# Patient Record
Sex: FEMALE | Race: BLACK OR AFRICAN AMERICAN | Employment: UNEMPLOYED | URBAN - NONMETROPOLITAN AREA
[De-identification: names, ages, dates, MRNs, and addresses within clinical notes are randomized per-mention and may not be internally consistent; named-entity substitution may affect disease eponyms.]

---

## 2017-09-14 ENCOUNTER — OFFICE VISIT (OUTPATIENT)
Dept: FAMILY MEDICINE CLINIC | Age: 32
End: 2017-09-14
Payer: OTHER GOVERNMENT

## 2017-09-14 VITALS
BODY MASS INDEX: 28.17 KG/M2 | OXYGEN SATURATION: 98 % | SYSTOLIC BLOOD PRESSURE: 110 MMHG | WEIGHT: 165 LBS | DIASTOLIC BLOOD PRESSURE: 72 MMHG | HEIGHT: 64 IN | RESPIRATION RATE: 16 BRPM | HEART RATE: 86 BPM

## 2017-09-14 DIAGNOSIS — Z00.00 ENCOUNTER FOR ANNUAL PHYSICAL EXAM: Primary | ICD-10-CM

## 2017-09-14 PROCEDURE — 99385 PREV VISIT NEW AGE 18-39: CPT | Performed by: FAMILY MEDICINE

## 2017-09-14 ASSESSMENT — ENCOUNTER SYMPTOMS
EYE DISCHARGE: 0
DIARRHEA: 0
ABDOMINAL PAIN: 0
WHEEZING: 0
NAUSEA: 0
SORE THROAT: 0
CONSTIPATION: 0
SHORTNESS OF BREATH: 0
COUGH: 0
RHINORRHEA: 0

## 2017-09-14 ASSESSMENT — PATIENT HEALTH QUESTIONNAIRE - PHQ9
2. FEELING DOWN, DEPRESSED OR HOPELESS: 0
1. LITTLE INTEREST OR PLEASURE IN DOING THINGS: 0
SUM OF ALL RESPONSES TO PHQ QUESTIONS 1-9: 0
SUM OF ALL RESPONSES TO PHQ9 QUESTIONS 1 & 2: 0

## 2017-11-10 ENCOUNTER — HOSPITAL ENCOUNTER (EMERGENCY)
Age: 32
Discharge: HOME | End: 2017-11-10
Payer: OTHER GOVERNMENT

## 2017-11-10 VITALS
TEMPERATURE: 99.8 F | HEART RATE: 109 BPM | OXYGEN SATURATION: 96 % | HEIGHT: 64 IN | DIASTOLIC BLOOD PRESSURE: 82 MMHG | WEIGHT: 165 LBS | BODY MASS INDEX: 28.17 KG/M2 | RESPIRATION RATE: 16 BRPM | SYSTOLIC BLOOD PRESSURE: 133 MMHG

## 2017-11-10 DIAGNOSIS — J03.00 ACUTE NON-RECURRENT STREPTOCOCCAL TONSILLITIS: Primary | ICD-10-CM

## 2017-11-10 LAB
GROUP A STREP CULTURE, REFLEX: POSITIVE
REFLEX THROAT C + S: ABNORMAL

## 2017-11-10 PROCEDURE — 87880 STREP A ASSAY W/OPTIC: CPT

## 2017-11-10 PROCEDURE — 99213 OFFICE O/P EST LOW 20 MIN: CPT

## 2017-11-10 PROCEDURE — 99213 OFFICE O/P EST LOW 20 MIN: CPT | Performed by: NURSE PRACTITIONER

## 2017-11-10 RX ORDER — AMOXICILLIN 875 MG/1
875 TABLET, COATED ORAL 2 TIMES DAILY
Qty: 20 TABLET | Refills: 0 | Status: SHIPPED | OUTPATIENT
Start: 2017-11-10 | End: 2017-11-20

## 2017-11-10 ASSESSMENT — ENCOUNTER SYMPTOMS
SHORTNESS OF BREATH: 0
TROUBLE SWALLOWING: 0
SORE THROAT: 1
VOMITING: 0
RHINORRHEA: 0
COUGH: 0

## 2017-11-10 ASSESSMENT — PAIN SCALES - GENERAL: PAINLEVEL_OUTOF10: 10

## 2017-11-10 ASSESSMENT — PAIN DESCRIPTION - LOCATION: LOCATION: THROAT

## 2017-11-10 NOTE — ED PROVIDER NOTES
data to display  PROCEDURES:  None  FINAL IMPRESSION      1. Acute non-recurrent streptococcal tonsillitis        DISPOSITION/PLAN   DISPOSITION Decision to Discharge     Nontoxic, no acute distress. Amoxicillin as prescribed. Diet as tolerated. If symptoms worsen go to ER. PATIENT REFERRED TO:  Levi Nguyen MD  [address] [place] [facility] [address]  [phone]    In 3 days  Follow up as needed. AMOXIL as prescribed. Diet as tolerated, increase fluids. If worse go to ER.     DISCHARGE MEDICATIONS:  Discharge Medication List as of 11/10/2017 12:35 PM      START taking these medications    Details   amoxicillin (AMOXIL) 875 MG tablet Take 1 tablet by mouth 2 times daily for 10 days, Disp-20 tablet, R-0Normal           Discharge Medication List as of 11/10/2017 12:35 PM          SALBADOR De La O NP  11/10/17 6684

## 2017-12-11 ENCOUNTER — OFFICE VISIT (OUTPATIENT)
Dept: FAMILY MEDICINE CLINIC | Age: 32
End: 2017-12-11
Payer: OTHER GOVERNMENT

## 2017-12-11 VITALS
OXYGEN SATURATION: 97 % | WEIGHT: 168.8 LBS | BODY MASS INDEX: 28.97 KG/M2 | SYSTOLIC BLOOD PRESSURE: 122 MMHG | RESPIRATION RATE: 16 BRPM | HEART RATE: 78 BPM | DIASTOLIC BLOOD PRESSURE: 58 MMHG

## 2017-12-11 DIAGNOSIS — Z01.419 ENCOUNTER FOR GYNECOLOGICAL EXAMINATION WITHOUT ABNORMAL FINDING: Primary | ICD-10-CM

## 2017-12-11 DIAGNOSIS — Z11.3 SCREEN FOR STD (SEXUALLY TRANSMITTED DISEASE): ICD-10-CM

## 2017-12-11 DIAGNOSIS — N94.6 DYSMENORRHEA: ICD-10-CM

## 2017-12-11 PROCEDURE — 99395 PREV VISIT EST AGE 18-39: CPT | Performed by: FAMILY MEDICINE

## 2017-12-11 ASSESSMENT — ENCOUNTER SYMPTOMS
COUGH: 0
SORE THROAT: 0
DIARRHEA: 0
WHEEZING: 0
CONSTIPATION: 0
RHINORRHEA: 0
ABDOMINAL PAIN: 0
SHORTNESS OF BREATH: 0
NAUSEA: 0

## 2017-12-11 NOTE — PROGRESS NOTES
Dallin Solorzano  [address] [place] [address]  Dept: [phone]  Dept Fax: [phone]  Loc: [phone]    Sammy Marino is a 29 y.o. female who presents today for her medical conditions/complaints as noted below. Chief Complaint   Patient presents with    Gynecologic Exam     annual pap exam. having alot of clots and and pain during anf after period. also alot of pain after period           HPI:     Here for Pap smear. States she has had excessive bleeding during her periods as well as cramping and clots. She is trying to conceive but has been unable to do so. She does have a history of STDs with trichomonas being one year ago. Would like screen for STDs again today. States that she occasionally has pain in her left side but was told that she has a cyst on her left ovary. 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 Otherwise patient is doing well without complaints. No current outpatient prescriptions on file. No current facility-administered medications for this visit. No Known Allergies    Subjective:      Review of Systems   Constitutional: Negative for chills, fatigue and fever. HENT: Negative for congestion, rhinorrhea and sore throat. Respiratory: Negative for cough, shortness of breath and wheezing. Cardiovascular: Negative for chest pain and palpitations. Gastrointestinal: Negative for abdominal pain, constipation, diarrhea and nausea. Genitourinary: Positive for menstrual problem, pelvic pain and vaginal bleeding. Negative for dysuria and hematuria. Musculoskeletal: Negative for arthralgias and myalgias. Neurological: Negative for dizziness and headaches. Psychiatric/Behavioral: Negative for sleep disturbance. The patient is not nervous/anxious.         Objective:     BP (!) 122/58 (Site: Left Arm, Position: Sitting)   Pulse 78   Resp 16   Wt 168 lb 12.8 oz (76.6 kg)   SpO2 97%   BMI 28.97 kg/m²     Physical Exam   Constitutional: She is oriented to person, place, and time. She appears well-developed and well-nourished. HENT:   Head: Normocephalic and atraumatic. Eyes: Conjunctivae and EOM are normal. Right eye exhibits no discharge. Left eye exhibits no discharge. No scleral icterus. Neck: Normal range of motion. Cardiovascular: Normal rate, regular rhythm and normal heart sounds. Pulmonary/Chest: Effort normal and breath sounds normal. She has no wheezes. Abdominal: Soft. Bowel sounds are normal. She exhibits no distension. There is no tenderness. Genitourinary: Uterus normal. There is no rash, tenderness, lesion or injury on the right labia. There is no rash, tenderness, lesion or injury on the left labia. Cervix exhibits no motion tenderness, no discharge and no friability. Right adnexum displays no mass, no tenderness and no fullness. Left adnexum displays tenderness. Left adnexum displays no mass and no fullness. There is tenderness in the vagina. Vaginal discharge found. Musculoskeletal: She exhibits no edema or tenderness. Lymphadenopathy:     She has no cervical adenopathy. Neurological: She is alert and oriented to person, place, and time. Coordination normal.   Skin: Skin is warm and dry. Psychiatric: She has a normal mood and affect. Her behavior is normal. Judgment and thought content normal.   Nursing note and vitals reviewed. Assessment:      1. Encounter for gynecological examination without abnormal finding  PAP SMEAR   2. Dysmenorrhea  Comprehensive Metabolic Panel    CBC Auto Differential    Comprehensive Metabolic Panel   3. Screen for STD (sexually transmitted disease)  HIV-1 antibody, EIA    Hepatitis Panel, Acute    HSV 1 and 2 Specific Ab, IgG    C. Trachomatis / N. Gonorrhoeae, DNA    Trichomonas Screen    Culture, Genital    Culture, Genital    Trichomonas Screen    C. Trachomatis / N.  Gonorrhoeae, DNA    HSV 1 and 2 Specific Ab,

## 2017-12-12 LAB
CHLAMYDIA TRACHOMATIS BY RT-PCR: NOT DETECTED
CT/NG SOURCE: NORMAL
NEISSERIA GONORRHOEAE BY RT-PCR: NOT DETECTED

## 2017-12-14 LAB — GYNECOLOGY CYTOLOGY REPORT: NORMAL

## 2017-12-20 LAB
CANDIDA SPECIES, DNA PROBE: NEGATIVE
GARDNERELLA VAGINALIS, DNA PROBE: NEGATIVE
TRICHOMONAS VAGINALIS DNA: NEGATIVE

## 2019-03-19 ENCOUNTER — TELEPHONE (OUTPATIENT)
Dept: FAMILY MEDICINE CLINIC | Age: 34
End: 2019-03-19

## 2019-03-19 ENCOUNTER — HOSPITAL ENCOUNTER (EMERGENCY)
Age: 34
Discharge: HOME | End: 2019-03-19
Payer: OTHER GOVERNMENT

## 2019-03-19 ENCOUNTER — APPOINTMENT (OUTPATIENT)
Dept: ULTRASOUND IMAGING | Age: 34
End: 2019-03-19
Payer: OTHER GOVERNMENT

## 2019-03-19 VITALS
RESPIRATION RATE: 18 BRPM | HEIGHT: 64 IN | HEART RATE: 72 BPM | OXYGEN SATURATION: 99 % | BODY MASS INDEX: 29.02 KG/M2 | TEMPERATURE: 98.2 F | DIASTOLIC BLOOD PRESSURE: 92 MMHG | WEIGHT: 170 LBS | SYSTOLIC BLOOD PRESSURE: 133 MMHG

## 2019-03-19 DIAGNOSIS — O46.90 VAGINAL BLEEDING IN PREGNANCY: Primary | ICD-10-CM

## 2019-03-19 LAB
ABO: NORMAL
ALBUMIN SERPL-MCNC: 4 G/DL (ref 3.5–5.1)
ALP BLD-CCNC: 71 U/L (ref 38–126)
ALT SERPL-CCNC: 14 U/L (ref 11–66)
AMPHETAMINE+METHAMPHETAMINE URINE SCREEN: NEGATIVE
ANION GAP SERPL CALCULATED.3IONS-SCNC: 11 MEQ/L (ref 8–16)
AST SERPL-CCNC: 19 U/L (ref 5–40)
BACTERIA: ABNORMAL /HPF
BARBITURATE QUANTITATIVE URINE: NEGATIVE
BASOPHILS # BLD: 1 %
BASOPHILS ABSOLUTE: 0.1 THOU/MM3 (ref 0–0.1)
BENZODIAZEPINE QUANTITATIVE URINE: NEGATIVE
BILIRUB SERPL-MCNC: 0.4 MG/DL (ref 0.3–1.2)
BILIRUBIN URINE: NEGATIVE
BLOOD, URINE: ABNORMAL
BUN BLDV-MCNC: 7 MG/DL (ref 7–22)
CALCIUM SERPL-MCNC: 9.4 MG/DL (ref 8.5–10.5)
CANNABINOID QUANTITATIVE URINE: NEGATIVE
CASTS 2: ABNORMAL /LPF
CASTS UA: ABNORMAL /LPF
CHARACTER, URINE: CLEAR
CHLORIDE BLD-SCNC: 104 MEQ/L (ref 98–111)
CO2: 24 MEQ/L (ref 23–33)
COCAINE METABOLITE QUANTITATIVE URINE: NEGATIVE
COLOR: YELLOW
CREAT SERPL-MCNC: 0.5 MG/DL (ref 0.4–1.2)
CRYSTALS, UA: ABNORMAL
EOSINOPHIL # BLD: 1.5 %
EOSINOPHILS ABSOLUTE: 0.1 THOU/MM3 (ref 0–0.4)
EPITHELIAL CELLS, UA: ABNORMAL /HPF
ERYTHROCYTE [DISTWIDTH] IN BLOOD BY AUTOMATED COUNT: 11.2 % (ref 11.5–14.5)
ERYTHROCYTE [DISTWIDTH] IN BLOOD BY AUTOMATED COUNT: 38.3 FL (ref 35–45)
GFR SERPL CREATININE-BSD FRML MDRD: > 90 ML/MIN/1.73M2
GLUCOSE BLD-MCNC: 92 MG/DL (ref 70–108)
GLUCOSE URINE: NEGATIVE MG/DL
HCG,BETA SUBUNIT,QUAL,SERUM: 13.6 MIU/ML (ref 0–5)
HCT VFR BLD CALC: 43.8 % (ref 37–47)
HEMOGLOBIN: 14.6 GM/DL (ref 12–16)
IMMATURE GRANS (ABS): 0.01 THOU/MM3 (ref 0–0.07)
IMMATURE GRANULOCYTES: 0.2 %
KETONES, URINE: NEGATIVE
LEUKOCYTE ESTERASE, URINE: NEGATIVE
LYMPHOCYTES # BLD: 28.2 %
LYMPHOCYTES ABSOLUTE: 1.7 THOU/MM3 (ref 1–4.8)
MCH RBC QN AUTO: 30.9 PG (ref 26–33)
MCHC RBC AUTO-ENTMCNC: 33.3 GM/DL (ref 32.2–35.5)
MCV RBC AUTO: 92.6 FL (ref 81–99)
MISCELLANEOUS 2: ABNORMAL
MONOCYTES # BLD: 6.8 %
MONOCYTES ABSOLUTE: 0.4 THOU/MM3 (ref 0.4–1.3)
NITRITE, URINE: NEGATIVE
NUCLEATED RED BLOOD CELLS: 0 /100 WBC
OPIATES, URINE: NEGATIVE
OSMOLALITY CALCULATION: 275.2 MOSMOL/KG (ref 275–300)
OXYCODONE: NEGATIVE
PH UA: 6.5 (ref 5–9)
PHENCYCLIDINE QUANTITATIVE URINE: NEGATIVE
PLATELET # BLD: 387 THOU/MM3 (ref 130–400)
PMV BLD AUTO: 9.1 FL (ref 9.4–12.4)
POTASSIUM SERPL-SCNC: 4.3 MEQ/L (ref 3.5–5.2)
PREGNANCY, SERUM: POSITIVE
PROTEIN UA: NEGATIVE
RBC # BLD: 4.73 MILL/MM3 (ref 4.2–5.4)
RBC URINE: ABNORMAL /HPF
RENAL EPITHELIAL, UA: ABNORMAL
RH FACTOR: NORMAL
SEG NEUTROPHILS: 62.3 %
SEGMENTED NEUTROPHILS ABSOLUTE COUNT: 3.7 THOU/MM3 (ref 1.8–7.7)
SODIUM BLD-SCNC: 139 MEQ/L (ref 135–145)
SPECIFIC GRAVITY, URINE: 1.01 (ref 1–1.03)
TOTAL PROTEIN: 7.7 G/DL (ref 6.1–8)
UROBILINOGEN, URINE: 0.2 EU/DL (ref 0–1)
WBC # BLD: 5.9 THOU/MM3 (ref 4.8–10.8)
WBC UA: ABNORMAL /HPF
YEAST: ABNORMAL

## 2019-03-19 PROCEDURE — 2709999900 HC NON-CHARGEABLE SUPPLY

## 2019-03-19 PROCEDURE — 36415 COLL VENOUS BLD VENIPUNCTURE: CPT

## 2019-03-19 PROCEDURE — 80307 DRUG TEST PRSMV CHEM ANLYZR: CPT

## 2019-03-19 PROCEDURE — 6370000000 HC RX 637 (ALT 250 FOR IP): Performed by: PHYSICIAN ASSISTANT

## 2019-03-19 PROCEDURE — 85025 COMPLETE CBC W/AUTO DIFF WBC: CPT

## 2019-03-19 PROCEDURE — 76817 TRANSVAGINAL US OBSTETRIC: CPT

## 2019-03-19 PROCEDURE — 86900 BLOOD TYPING SEROLOGIC ABO: CPT

## 2019-03-19 PROCEDURE — 99284 EMERGENCY DEPT VISIT MOD MDM: CPT

## 2019-03-19 PROCEDURE — 84702 CHORIONIC GONADOTROPIN TEST: CPT

## 2019-03-19 PROCEDURE — 81001 URINALYSIS AUTO W/SCOPE: CPT

## 2019-03-19 PROCEDURE — 80053 COMPREHEN METABOLIC PANEL: CPT

## 2019-03-19 PROCEDURE — 86901 BLOOD TYPING SEROLOGIC RH(D): CPT

## 2019-03-19 PROCEDURE — 84703 CHORIONIC GONADOTROPIN ASSAY: CPT

## 2019-03-19 RX ORDER — ACETAMINOPHEN 325 MG/1
650 TABLET ORAL ONCE
Status: COMPLETED | OUTPATIENT
Start: 2019-03-19 | End: 2019-03-19

## 2019-03-19 RX ADMIN — ACETAMINOPHEN 650 MG: 325 TABLET ORAL at 09:34

## 2019-03-19 ASSESSMENT — PAIN SCALES - GENERAL
PAINLEVEL_OUTOF10: 8
PAINLEVEL_OUTOF10: 8
PAINLEVEL_OUTOF10: 6

## 2019-03-19 ASSESSMENT — ENCOUNTER SYMPTOMS
SHORTNESS OF BREATH: 0
ABDOMINAL PAIN: 1
SORE THROAT: 0
CHEST TIGHTNESS: 0
BACK PAIN: 0
PHOTOPHOBIA: 0
CONSTIPATION: 0
NAUSEA: 0
DIARRHEA: 0
WHEEZING: 0
EYE PAIN: 0
RHINORRHEA: 0
COUGH: 0
VOMITING: 0
BLOOD IN STOOL: 0

## 2019-03-19 ASSESSMENT — PAIN DESCRIPTION - DESCRIPTORS: DESCRIPTORS: CRAMPING

## 2019-03-19 ASSESSMENT — PAIN DESCRIPTION - PAIN TYPE: TYPE: ACUTE PAIN

## 2019-03-19 ASSESSMENT — PAIN DESCRIPTION - LOCATION: LOCATION: ABDOMEN

## 2019-03-21 ENCOUNTER — HOSPITAL ENCOUNTER (OUTPATIENT)
Age: 34
Discharge: HOME | End: 2019-03-21
Payer: OTHER GOVERNMENT

## 2019-03-21 ENCOUNTER — OFFICE VISIT (OUTPATIENT)
Dept: FAMILY MEDICINE CLINIC | Age: 34
End: 2019-03-21
Payer: OTHER GOVERNMENT

## 2019-03-21 VITALS
DIASTOLIC BLOOD PRESSURE: 64 MMHG | BODY MASS INDEX: 29.52 KG/M2 | OXYGEN SATURATION: 95 % | SYSTOLIC BLOOD PRESSURE: 112 MMHG | RESPIRATION RATE: 16 BRPM | HEART RATE: 89 BPM | WEIGHT: 172 LBS

## 2019-03-21 DIAGNOSIS — Z13.0 SCREENING FOR SICKLE-CELL DISEASE OR TRAIT: Primary | ICD-10-CM

## 2019-03-21 DIAGNOSIS — Z01.10 ENCOUNTER FOR HEARING SCREENING WITHOUT ABNORMAL FINDINGS: ICD-10-CM

## 2019-03-21 DIAGNOSIS — O46.90 VAGINAL BLEEDING IN PREGNANCY: ICD-10-CM

## 2019-03-21 DIAGNOSIS — Z01.10 ENCOUNTER FOR HEARING TEST: Primary | ICD-10-CM

## 2019-03-21 DIAGNOSIS — Z13.0 SCREENING FOR DEFICIENCY ANEMIA: ICD-10-CM

## 2019-03-21 DIAGNOSIS — Z11.4 SCREENING FOR HIV (HUMAN IMMUNODEFICIENCY VIRUS): ICD-10-CM

## 2019-03-21 DIAGNOSIS — O03.9 SPONTANEOUS ABORTION IN FIRST TRIMESTER: Primary | ICD-10-CM

## 2019-03-21 LAB — HCG,BETA SUBUNIT,QUAL,SERUM: 4.2 MIU/ML (ref 0–5)

## 2019-03-21 PROCEDURE — 99214 OFFICE O/P EST MOD 30 MIN: CPT | Performed by: FAMILY MEDICINE

## 2019-03-21 PROCEDURE — 36415 COLL VENOUS BLD VENIPUNCTURE: CPT

## 2019-03-21 PROCEDURE — 84702 CHORIONIC GONADOTROPIN TEST: CPT

## 2019-03-21 ASSESSMENT — ENCOUNTER SYMPTOMS
ABDOMINAL PAIN: 0
RHINORRHEA: 0
DIARRHEA: 0
CONSTIPATION: 0
SHORTNESS OF BREATH: 0
NAUSEA: 0
WHEEZING: 0
SORE THROAT: 0
COUGH: 0
EYE DISCHARGE: 0

## 2019-03-25 ENCOUNTER — HOSPITAL ENCOUNTER (OUTPATIENT)
Dept: AUDIOLOGY | Age: 34
Discharge: HOME | End: 2019-03-25
Payer: OTHER GOVERNMENT

## 2019-03-25 PROCEDURE — 92557 COMPREHENSIVE HEARING TEST: CPT | Performed by: AUDIOLOGIST

## 2019-03-25 PROCEDURE — 92567 TYMPANOMETRY: CPT | Performed by: AUDIOLOGIST

## 2019-03-26 ENCOUNTER — HOSPITAL ENCOUNTER (OUTPATIENT)
Age: 34
Discharge: HOME | End: 2019-03-26
Payer: OTHER GOVERNMENT

## 2019-03-26 DIAGNOSIS — Z13.0 SCREENING FOR DEFICIENCY ANEMIA: ICD-10-CM

## 2019-03-26 DIAGNOSIS — Z11.4 SCREENING FOR HIV (HUMAN IMMUNODEFICIENCY VIRUS): ICD-10-CM

## 2019-03-26 DIAGNOSIS — Z13.0 SCREENING FOR SICKLE-CELL DISEASE OR TRAIT: ICD-10-CM

## 2019-03-26 LAB — SICKLE CELL SCREEN: NORMAL

## 2019-03-26 PROCEDURE — 82955 ASSAY OF G6PD ENZYME: CPT

## 2019-03-26 PROCEDURE — 36415 COLL VENOUS BLD VENIPUNCTURE: CPT

## 2019-03-26 PROCEDURE — 87389 HIV-1 AG W/HIV-1&-2 AB AG IA: CPT

## 2019-03-26 PROCEDURE — 85660 RBC SICKLE CELL TEST: CPT

## 2019-03-27 ENCOUNTER — OFFICE VISIT (OUTPATIENT)
Dept: FAMILY MEDICINE CLINIC | Age: 34
End: 2019-03-27
Payer: OTHER GOVERNMENT

## 2019-03-27 VITALS
BODY MASS INDEX: 30.21 KG/M2 | RESPIRATION RATE: 18 BRPM | WEIGHT: 176 LBS | DIASTOLIC BLOOD PRESSURE: 64 MMHG | OXYGEN SATURATION: 98 % | HEART RATE: 44 BPM | SYSTOLIC BLOOD PRESSURE: 128 MMHG

## 2019-03-27 DIAGNOSIS — Z23 NEED FOR TUBERCULOSIS VACCINATION: ICD-10-CM

## 2019-03-27 DIAGNOSIS — Z23 NEED FOR TDAP VACCINATION: ICD-10-CM

## 2019-03-27 DIAGNOSIS — Z00.00 ANNUAL PHYSICAL EXAM: Primary | ICD-10-CM

## 2019-03-27 PROCEDURE — 86580 TB INTRADERMAL TEST: CPT | Performed by: FAMILY MEDICINE

## 2019-03-27 PROCEDURE — 90471 IMMUNIZATION ADMIN: CPT | Performed by: FAMILY MEDICINE

## 2019-03-27 PROCEDURE — 99395 PREV VISIT EST AGE 18-39: CPT | Performed by: FAMILY MEDICINE

## 2019-03-27 PROCEDURE — 90715 TDAP VACCINE 7 YRS/> IM: CPT | Performed by: FAMILY MEDICINE

## 2019-03-27 ASSESSMENT — ENCOUNTER SYMPTOMS
COUGH: 0
DIARRHEA: 0
VOMITING: 0
BLOOD IN STOOL: 0
EYE DISCHARGE: 0
BACK PAIN: 0
RHINORRHEA: 0
SINUS PRESSURE: 0
CONSTIPATION: 0
EYE REDNESS: 0
SORE THROAT: 0
ABDOMINAL PAIN: 0
COLOR CHANGE: 0
WHEEZING: 0
SHORTNESS OF BREATH: 0
APNEA: 0
NAUSEA: 0

## 2019-03-27 ASSESSMENT — PATIENT HEALTH QUESTIONNAIRE - PHQ9
2. FEELING DOWN, DEPRESSED OR HOPELESS: 0
SUM OF ALL RESPONSES TO PHQ QUESTIONS 1-9: 0
SUM OF ALL RESPONSES TO PHQ9 QUESTIONS 1 & 2: 0
SUM OF ALL RESPONSES TO PHQ QUESTIONS 1-9: 0
1. LITTLE INTEREST OR PLEASURE IN DOING THINGS: 0

## 2019-03-29 LAB — HIV-2 AB: NEGATIVE

## 2019-03-31 LAB — GLUCOSE-6-PHOSPHATE DEHYDROGENASE QUAL: NORMAL

## 2019-05-06 ENCOUNTER — HOSPITAL ENCOUNTER (OUTPATIENT)
Age: 34
Discharge: HOME | End: 2019-05-06
Payer: OTHER GOVERNMENT

## 2019-05-06 LAB
PROGESTERONE LEVEL: 10.77 NG/ML
PROLACTIN: 15.4 NG/ML
TSH SERPL DL<=0.05 MIU/L-ACNC: 0.96 UIU/ML (ref 0.4–4.2)

## 2019-05-06 PROCEDURE — 36415 COLL VENOUS BLD VENIPUNCTURE: CPT

## 2019-05-06 PROCEDURE — 84144 ASSAY OF PROGESTERONE: CPT

## 2019-05-06 PROCEDURE — 84443 ASSAY THYROID STIM HORMONE: CPT

## 2019-05-06 PROCEDURE — 84146 ASSAY OF PROLACTIN: CPT

## 2019-05-14 ENCOUNTER — HOSPITAL ENCOUNTER (EMERGENCY)
Age: 34
Discharge: HOME | End: 2019-05-14
Attending: EMERGENCY MEDICINE
Payer: OTHER GOVERNMENT

## 2019-05-14 VITALS
WEIGHT: 170 LBS | DIASTOLIC BLOOD PRESSURE: 81 MMHG | RESPIRATION RATE: 16 BRPM | OXYGEN SATURATION: 98 % | HEART RATE: 98 BPM | BODY MASS INDEX: 29.18 KG/M2 | TEMPERATURE: 98.7 F | SYSTOLIC BLOOD PRESSURE: 120 MMHG

## 2019-05-14 DIAGNOSIS — L04.0 ACUTE CERVICAL ADENITIS: ICD-10-CM

## 2019-05-14 DIAGNOSIS — J03.90 ACUTE TONSILLITIS, UNSPECIFIED ETIOLOGY: Primary | ICD-10-CM

## 2019-05-14 PROCEDURE — 99213 OFFICE O/P EST LOW 20 MIN: CPT | Performed by: EMERGENCY MEDICINE

## 2019-05-14 PROCEDURE — 99212 OFFICE O/P EST SF 10 MIN: CPT

## 2019-05-14 RX ORDER — IBUPROFEN 600 MG/1
600 TABLET ORAL EVERY 6 HOURS PRN
Qty: 20 TABLET | Refills: 0 | Status: SHIPPED | OUTPATIENT
Start: 2019-05-14

## 2019-05-14 RX ORDER — AMOXICILLIN 500 MG/1
500 CAPSULE ORAL 3 TIMES DAILY
Qty: 30 CAPSULE | Refills: 0 | Status: SHIPPED | OUTPATIENT
Start: 2019-05-14 | End: 2019-05-24

## 2019-05-14 ASSESSMENT — ENCOUNTER SYMPTOMS
ABDOMINAL PAIN: 0
EYE REDNESS: 0
SORE THROAT: 1
STRIDOR: 0
SHORTNESS OF BREATH: 0
TROUBLE SWALLOWING: 0
RHINORRHEA: 1
BACK PAIN: 0
CONSTIPATION: 0
BLOOD IN STOOL: 0
EYE DISCHARGE: 0
VOMITING: 0
NAUSEA: 0
DIARRHEA: 0
CHOKING: 0
EYE PAIN: 0
VOICE CHANGE: 0
SINUS PRESSURE: 0
WHEEZING: 0
COUGH: 0
FACIAL SWELLING: 0

## 2019-05-14 ASSESSMENT — PAIN DESCRIPTION - LOCATION: LOCATION: THROAT

## 2019-05-14 ASSESSMENT — PAIN SCALES - GENERAL: PAINLEVEL_OUTOF10: 8

## 2019-05-14 ASSESSMENT — PAIN DESCRIPTION - PAIN TYPE: TYPE: ACUTE PAIN

## 2019-05-14 ASSESSMENT — PAIN DESCRIPTION - FREQUENCY: FREQUENCY: CONTINUOUS

## 2019-05-14 NOTE — ED PROVIDER NOTES
[address] [facility] Encounter      [address] [facility]       Chief Complaint   Patient presents with    Pharyngitis       Nurses Notes reviewed and I agree except as noted in the HPI. HISTORY OF PRESENT ILLNESS   Lakshmi Mcginnis is a 30 y.o. female who presents with 24-hour history of severe sore throat she rates at 8 out of 10 severity, associated with painful glands in the neck. No chest pain, shortness of breath, abdominal pain, vomiting or fever, ear pain, headache, altered mental status, lethargy, rash, diarrhea,  symptoms. Has tonsils, no history of diabetes or asthma. Nonsmoker. Patient not pregnant  REVIEW OF SYSTEMS     Review of Systems   Constitutional: Positive for appetite change and chills. Negative for fatigue, fever and unexpected weight change. HENT: Positive for postnasal drip, rhinorrhea and sore throat. Negative for congestion, ear discharge, ear pain, facial swelling, hearing loss, nosebleeds, sinus pressure, trouble swallowing and voice change. Eyes: Negative for pain, discharge, redness and visual disturbance. Respiratory: Negative for cough, choking, shortness of breath, wheezing and stridor. Cardiovascular: Negative for chest pain and leg swelling. Gastrointestinal: Negative for abdominal pain, blood in stool, constipation, diarrhea, nausea and vomiting. Genitourinary: Negative for dysuria, flank pain, frequency, hematuria, urgency, vaginal bleeding and vaginal discharge. Musculoskeletal: Negative for arthralgias, back pain, neck pain and neck stiffness. Skin: Negative for rash. Neurological: Negative for dizziness, seizures, syncope, weakness, light-headedness and headaches. Hematological: Positive for adenopathy. Does not bruise/bleed easily. Psychiatric/Behavioral: Negative for confusion, sleep disturbance and suicidal ideas. The patient is not nervous/anxious. All other systems reviewed and are negative.       PAST MEDICAL

## 2019-05-20 ENCOUNTER — TELEPHONE (OUTPATIENT)
Dept: FAMILY MEDICINE CLINIC | Age: 34
End: 2019-05-20

## 2019-05-20 NOTE — TELEPHONE ENCOUNTER
Pt needs her  paperwork faxed to the [facility] due to being there today and she forgot her paperwork.     [facility] fax: [phone]

## 2019-05-21 ENCOUNTER — TELEPHONE (OUTPATIENT)
Dept: FAMILY MEDICINE CLINIC | Age: 34
End: 2019-05-21

## 2019-05-21 ENCOUNTER — HOSPITAL ENCOUNTER (OUTPATIENT)
Age: 34
Discharge: HOME | End: 2019-05-21
Payer: OTHER GOVERNMENT

## 2019-05-21 DIAGNOSIS — Z01.84 IMMUNITY STATUS TESTING: ICD-10-CM

## 2019-05-21 DIAGNOSIS — Z01.84 IMMUNITY STATUS TESTING: Primary | ICD-10-CM

## 2019-05-21 LAB — HBV SURFACE AB TITR SER: NORMAL {TITER}

## 2019-05-21 PROCEDURE — 86787 VARICELLA-ZOSTER ANTIBODY: CPT

## 2019-05-21 PROCEDURE — 86706 HEP B SURFACE ANTIBODY: CPT

## 2019-05-21 PROCEDURE — 36415 COLL VENOUS BLD VENIPUNCTURE: CPT

## 2019-05-21 PROCEDURE — 86765 RUBEOLA ANTIBODY: CPT

## 2019-05-21 PROCEDURE — 86762 RUBELLA ANTIBODY: CPT

## 2019-05-21 NOTE — TELEPHONE ENCOUNTER
Pt called into the office stating that she needs to get titers done for Hep A, Hep B, MMR and Varicella. Pt will go have drawn at [address] [place] [address].

## 2019-05-23 ENCOUNTER — TELEPHONE (OUTPATIENT)
Dept: FAMILY MEDICINE CLINIC | Age: 34
End: 2019-05-23

## 2019-05-23 NOTE — TELEPHONE ENCOUNTER
She is most likely not immune to Hep B. The test was inconclusive and in the middle of positive and negative range. Will need to repeat Hep B injections.

## 2019-05-23 NOTE — TELEPHONE ENCOUNTER
Patient requesting lab results-I advised that most of her labs are still in process but that the Hep B antibody is back. Patient asked if we can give her results of that.

## 2019-05-24 LAB
RUBEOLA IGG: 50.8 AU/ML
RUBV IGG SER QL: 13.7 IU/ML
VZV IGG SER QL IA: 1.37

## 2019-05-30 ENCOUNTER — TELEPHONE (OUTPATIENT)
Dept: FAMILY MEDICINE CLINIC | Age: 34
End: 2019-05-30

## 2019-05-30 DIAGNOSIS — Z01.84 IMMUNITY STATUS TESTING: Primary | ICD-10-CM

## 2019-05-30 NOTE — TELEPHONE ENCOUNTER
Called spoke to the pt let her know that the 2 orders are in the system so she is able to get them drawn. Per Mony Adrian at this time pt is needing to repeat the Hep B series and we will wait to see the other 2 tieters results. Pt will go to have the BW drawn.

## 2019-05-30 NOTE — TELEPHONE ENCOUNTER
Please call pt and let her know we need to get two more test done. There was not enough blood drawn for these test to be ran.

## 2019-07-08 ENCOUNTER — NURSE ONLY (OUTPATIENT)
Dept: LAB | Age: 34
End: 2019-07-08

## 2019-07-08 LAB — PROGESTERONE LEVEL: 43.65 NG/ML
